# Patient Record
Sex: FEMALE | Race: BLACK OR AFRICAN AMERICAN | Employment: FULL TIME | URBAN - NONMETROPOLITAN AREA
[De-identification: names, ages, dates, MRNs, and addresses within clinical notes are randomized per-mention and may not be internally consistent; named-entity substitution may affect disease eponyms.]

---

## 2018-06-08 ENCOUNTER — HOSPITAL ENCOUNTER (OUTPATIENT)
Dept: WOMENS IMAGING | Age: 53
Discharge: HOME | End: 2018-06-08
Payer: COMMERCIAL

## 2018-06-08 DIAGNOSIS — Z12.31 VISIT FOR SCREENING MAMMOGRAM: ICD-10-CM

## 2018-06-08 PROCEDURE — 77063 BREAST TOMOSYNTHESIS BI: CPT

## 2018-07-25 ENCOUNTER — HOSPITAL ENCOUNTER (EMERGENCY)
Age: 53
Discharge: HOME | End: 2018-07-25
Payer: COMMERCIAL

## 2018-07-25 VITALS
HEART RATE: 88 BPM | RESPIRATION RATE: 16 BRPM | BODY MASS INDEX: 28.82 KG/M2 | OXYGEN SATURATION: 96 % | TEMPERATURE: 97.5 F | DIASTOLIC BLOOD PRESSURE: 78 MMHG | SYSTOLIC BLOOD PRESSURE: 128 MMHG | WEIGHT: 184 LBS

## 2018-07-25 DIAGNOSIS — L50.9 URTICARIA: ICD-10-CM

## 2018-07-25 DIAGNOSIS — R21 RASH AND OTHER NONSPECIFIC SKIN ERUPTION: Primary | ICD-10-CM

## 2018-07-25 PROCEDURE — 6360000002 HC RX W HCPCS: Performed by: NURSE PRACTITIONER

## 2018-07-25 PROCEDURE — 99213 OFFICE O/P EST LOW 20 MIN: CPT | Performed by: NURSE PRACTITIONER

## 2018-07-25 PROCEDURE — 96372 THER/PROPH/DIAG INJ SC/IM: CPT

## 2018-07-25 PROCEDURE — 99212 OFFICE O/P EST SF 10 MIN: CPT

## 2018-07-25 RX ORDER — PREDNISONE 20 MG/1
TABLET ORAL
Qty: 21 TABLET | Refills: 0 | Status: SHIPPED | OUTPATIENT
Start: 2018-07-25

## 2018-07-25 RX ORDER — METHYLPREDNISOLONE ACETATE 80 MG/ML
80 INJECTION, SUSPENSION INTRA-ARTICULAR; INTRALESIONAL; INTRAMUSCULAR; SOFT TISSUE ONCE
Status: COMPLETED | OUTPATIENT
Start: 2018-07-25 | End: 2018-07-25

## 2018-07-25 RX ADMIN — METHYLPREDNISOLONE ACETATE 80 MG: 80 INJECTION, SUSPENSION INTRA-ARTICULAR; INTRALESIONAL; INTRAMUSCULAR; SOFT TISSUE at 10:44

## 2018-07-25 ASSESSMENT — ENCOUNTER SYMPTOMS: COLOR CHANGE: 0

## 2018-07-25 NOTE — ED PROVIDER NOTES
smokeless tobacco. She reports that she drinks alcohol. She reports that she does not use drugs. PHYSICAL EXAM     ED TRIAGE VITALS  BP: 128/78, Temp: 97.5 °F (36.4 °C), Pulse: 88, Resp: 16, SpO2: 96 %,Estimated body mass index is 28.82 kg/m² as calculated from the following:    Height as of 11/1/16: 5' 7\" (1.702 m). Weight as of this encounter: 184 lb (83.5 kg). ,No LMP recorded. Patient has had a hysterectomy. Physical Exam   Constitutional: She is oriented to person, place, and time. She appears well-developed and well-nourished. Neurological: She is alert and oriented to person, place, and time. Skin: Skin is warm and dry. Rash (red wheals) noted. Psychiatric: She has a normal mood and affect. Her behavior is normal. Judgment and thought content normal.       DIAGNOSTIC RESULTS   Labs:No results found for this visit on 07/25/18. IMAGING:    No orders to display     URGENT CARE COURSE:     Vitals:    07/25/18 1030   BP: 128/78   Pulse: 88   Resp: 16   Temp: 97.5 °F (36.4 °C)   TempSrc: Temporal   SpO2: 96%   Weight: 184 lb (83.5 kg)       Medications   methylPREDNISolone acetate (DEPO-MEDROL) injection 80 mg (80 mg Intramuscular Given 7/25/18 1044)            PROCEDURES:  None    FINAL IMPRESSION      1. Rash and other nonspecific skin eruption    2. Urticaria          DISPOSITION/PLAN   DISPOSITION Decision To Discharge 07/25/2018 10:41:06 AM patient is discharged home with tapering dose for prednisone over the next 2 weeks. Patient also received Depo-Medrol injection at urgent care. Patient has appointment with PCP tomorrow, discussed with patient that she may have rash evaluated at that time as well. Suggested that patient try adult Benadryl at bedtime to help with itching relief. She may also use calamine lotion for areas of most irritation.          PATIENT REFERRED TO:  KAY Fermin CNP/[facility] [address]  [phone]      As needed, If symptoms worsen      DISCHARGE MEDICATIONS:  New Prescriptions    PREDNISONE (DELTASONE) 20 MG TABLET    40 mg/ day for week 1, 20 mg/ day for week 2.        Discontinued Medications    PSEUDOEPH-DOXYLAMINE-DM-APAP (NITE TIME COLD PO)    Take by mouth       Current Discharge Medication List          KAY Ohara NP    (Please note that portions of this note were completed with a voice recognition program.  Efforts were made to edit the dictations but occasionally words are mis-transcribed.)         KAY Bowman NP  07/25/18 5729

## 2020-10-30 ENCOUNTER — HOSPITAL ENCOUNTER (OUTPATIENT)
Dept: WOMENS IMAGING | Age: 55
Discharge: HOME | End: 2020-10-30
Payer: COMMERCIAL

## 2020-10-30 PROCEDURE — 77063 BREAST TOMOSYNTHESIS BI: CPT

## 2022-03-21 NOTE — ED NOTES
How Severe Are Your Spot(S)?: mild Patient understood instructions verbally,  Follow up with PCP with any concerns, or worse rash with elevated fevers,  follow up with ED. prescriptions with patient. ambulated self to lobby,stable condition.       Taylor Wallace LPN  95/94/79 1055 What Type Of Note Output Would You Prefer (Optional)?: Bullet Format What Is The Reason For Today's Visit?: Full Body Skin Examination What Is The Reason For Today's Visit? (Being Monitored For X): the development of new lesions

## 2023-04-25 ENCOUNTER — HOSPITAL ENCOUNTER (OUTPATIENT)
Dept: WOMENS IMAGING | Age: 58
Discharge: HOME | End: 2023-04-25
Payer: COMMERCIAL

## 2023-04-25 DIAGNOSIS — Z12.31 VISIT FOR SCREENING MAMMOGRAM: ICD-10-CM

## 2023-04-25 PROCEDURE — 77063 BREAST TOMOSYNTHESIS BI: CPT

## 2024-02-10 ENCOUNTER — HOSPITAL ENCOUNTER (EMERGENCY)
Age: 59
Discharge: HOME | End: 2024-02-10
Payer: COMMERCIAL

## 2024-02-10 VITALS
RESPIRATION RATE: 16 BRPM | BODY MASS INDEX: 29.13 KG/M2 | TEMPERATURE: 97 F | HEART RATE: 96 BPM | OXYGEN SATURATION: 94 % | WEIGHT: 186 LBS | DIASTOLIC BLOOD PRESSURE: 83 MMHG | SYSTOLIC BLOOD PRESSURE: 144 MMHG

## 2024-02-10 DIAGNOSIS — H93.13 TINNITUS OF BOTH EARS: Primary | ICD-10-CM

## 2024-02-10 DIAGNOSIS — H81.13 BENIGN PAROXYSMAL POSITIONAL VERTIGO DUE TO BILATERAL VESTIBULAR DISORDER: ICD-10-CM

## 2024-02-10 PROCEDURE — 99212 OFFICE O/P EST SF 10 MIN: CPT | Performed by: EMERGENCY MEDICINE

## 2024-02-10 PROCEDURE — 99213 OFFICE O/P EST LOW 20 MIN: CPT

## 2024-02-10 RX ORDER — FLUTICASONE PROPIONATE 50 MCG
1 SPRAY, SUSPENSION (ML) NASAL DAILY
Qty: 16 G | Refills: 0 | Status: SHIPPED | OUTPATIENT
Start: 2024-02-10

## 2024-02-10 RX ORDER — MECLIZINE HYDROCHLORIDE 25 MG/1
25 TABLET ORAL 3 TIMES DAILY PRN
Qty: 15 TABLET | Refills: 0 | Status: SHIPPED | OUTPATIENT
Start: 2024-02-10 | End: 2024-02-20

## 2024-02-10 ASSESSMENT — ENCOUNTER SYMPTOMS
RHINORRHEA: 0
SINUS PAIN: 0
COUGH: 0
SINUS PRESSURE: 0
SHORTNESS OF BREATH: 0

## 2024-02-10 NOTE — DISCHARGE INSTR - COC
Continuity of Care Form    Patient Name: Gricelda Garza   :  1965  MRN:  [id number]    Admit date:  2/10/2024  Discharge date:  ***    Code Status Order: No Order   Advance Directives:     Admitting Physician:  No admitting provider for patient encounter.  PCP: Micaela Dsa APRN - CNP    Discharging Nurse: ***  Discharging Hospital Unit/Room#:   Discharging Unit Phone Number: ***    Emergency Contact:   Extended Emergency Contact Information  Primary Emergency Contact: Denny Garza   [facility]  Home Phone: [phone]  Mobile Phone: [phone]  Relation: Child    Past Surgical History:  Past Surgical History:   Procedure Laterality Date    COLONOSCOPY      HYSTERECTOMY (CERVIX STATUS UNKNOWN)      BSO    OVARY REMOVAL         Immunization History:   Immunization History   Administered Date(s) Administered    COVID-19, J&J, (age 18y+), IM, 0.5 mL 2021       Active Problems:  Patient Active Problem List   Diagnosis Code    FH: colon cancer Z80.0       Isolation/Infection:   Isolation            No Isolation          Patient Infection Status       None to display            Nurse Assessment:  Last Vital Signs: BP (!) 144/83   Pulse 96   Temp 97 °F (36.1 °C) (Temporal)   Resp 16   Wt 84.4 kg (186 lb)   SpO2 94%   BMI 29.13 kg/m²     Last documented pain score (0-10 scale):    Last Weight:   Wt Readings from Last 1 Encounters:   02/10/24 84.4 kg (186 lb)     Mental Status:  {IP PT MENTAL STATUS:46512}    IV Access:  { MALINA IV ACCESS:838985902}    Nursing Mobility/ADLs:  Walking   {CHP DME ADLs:572669925}  Transfer  {CHP DME ADLs:805454712}  Bathing  {CHP DME ADLs:673500134}  Dressing  {CHP DME ADLs:003377568}  Toileting  {CHP DME ADLs:616604878}  Feeding  {CHP DME ADLs:140076113}  Med Admin  {CHP DME ADLs:622467763}  Med Delivery   { MALINA MED Delivery:637643548}    Wound Care Documentation and Therapy:        Elimination:  Continence:   Bowel: {YES /

## 2024-02-10 NOTE — DISCHARGE INSTRUCTIONS
Flonase daily as directed to see if this helps with your symptoms    Meclizine as directed as needed for dizziness    Follow-up with your primary care provider next week as planned for reevaluation    Return sooner for new or worsening symptoms

## 2024-02-10 NOTE — ED PROVIDER NOTES
[facility] URGENT [facility]ARE  Urgent Care Encounter       CHIEF COMPLAINT       Chief Complaint   Patient presents with    Otalgia     Right       Nurses Notes reviewed and I agree except as noted in the HPI.  HISTORY OF PRESENT ILLNESS   Gricelda Garza is a 58 y.o. female who presents for ringing in bilateral ears, mild discomfort to the ears but no significant pain.  No hearing loss.  She has also had intermittent vertigo/dizziness.  Symptoms began 3 days ago.  She denies any nasal congestion, rhinorrhea or postnasal drip.  No cough.  No sore throat.  No new medications.  She denies aspirin use    HPI    REVIEW OF SYSTEMS     Review of Systems   Constitutional:  Negative for activity change, fatigue and fever.   HENT:  Positive for ear pain and tinnitus. Negative for congestion, rhinorrhea, sinus pressure and sinus pain.    Respiratory:  Negative for cough and shortness of breath.    Neurological:  Positive for dizziness. Negative for headaches.       PAST MEDICAL HISTORY         Diagnosis Date    Anxiety attack        SURGICALHISTORY     Patient  has a past surgical history that includes Colonoscopy; Hysterectomy; and Ovary removal.    CURRENT MEDICATIONS       Previous Medications    DIPHENHYDRAMINE HCL (BENADRYL ALLERGY CHILDRENS PO)    Take by mouth    IBUPROFEN (ADVIL;MOTRIN) 200 MG TABLET    Take 1 tablet by mouth every 6 hours as needed    LORATADINE (CLARITIN) 10 MG TABLET    Take 1 tablet by mouth daily    MULTIPLE VITAMINS-MINERALS (MULTI COMPLETE PO)    Take  by mouth.      PREDNISONE (DELTASONE) 20 MG TABLET    40 mg/ day for week 1, 20 mg/ day for week 2.       ALLERGIES     Patient is has No Known Allergies.    Patients   Immunization History   Administered Date(s) Administered    COVID-19, J&J, (age 18y+), IM, 0.5 mL 03/09/2021       FAMILY HISTORY     Patient's family history includes Bilateral breast cancer (age of onset: 60) in her maternal aunt; Cancer in her mother; Diabetes in her

## 2024-06-21 ENCOUNTER — HOSPITAL ENCOUNTER (OUTPATIENT)
Dept: WOMENS IMAGING | Age: 59
Discharge: HOME | End: 2024-06-21
Payer: COMMERCIAL

## 2024-06-21 VITALS — BODY MASS INDEX: 29.19 KG/M2 | WEIGHT: 186 LBS | HEIGHT: 67 IN

## 2024-06-21 DIAGNOSIS — Z12.31 ENCOUNTER FOR SCREENING MAMMOGRAM FOR MALIGNANT NEOPLASM OF BREAST: ICD-10-CM

## 2024-06-21 PROCEDURE — 77063 BREAST TOMOSYNTHESIS BI: CPT

## 2025-05-28 ENCOUNTER — TRANSCRIBE ORDERS (OUTPATIENT)
Dept: ADMINISTRATIVE | Age: 60
End: 2025-05-28

## 2025-05-28 DIAGNOSIS — Z12.31 SCREENING MAMMOGRAM FOR BREAST CANCER: Primary | ICD-10-CM

## 2025-05-28 DIAGNOSIS — Z78.0 ASYMPTOMATIC MENOPAUSAL STATE: Primary | ICD-10-CM
